# Patient Record
Sex: FEMALE
[De-identification: names, ages, dates, MRNs, and addresses within clinical notes are randomized per-mention and may not be internally consistent; named-entity substitution may affect disease eponyms.]

---

## 2019-02-11 ENCOUNTER — HOSPITAL ENCOUNTER (OUTPATIENT)
Dept: HOSPITAL 74 - FRADUS-SUR | Age: 49
Discharge: HOME | End: 2019-02-11
Attending: SURGERY
Payer: COMMERCIAL

## 2019-02-11 DIAGNOSIS — N60.31: Primary | ICD-10-CM

## 2019-02-11 DIAGNOSIS — N63.10: ICD-10-CM

## 2019-02-11 DIAGNOSIS — N60.81: ICD-10-CM

## 2019-02-11 PROCEDURE — 0HBT3ZX EXCISION OF RIGHT BREAST, PERCUTANEOUS APPROACH, DIAGNOSTIC: ICD-10-PCS | Performed by: SURGERY

## 2019-02-11 PROCEDURE — A4648 IMPLANTABLE TISSUE MARKER: HCPCS

## 2019-02-12 NOTE — OP
DATE OF OPERATION:  02/11/2019

 

PREOPERATIVE DIAGNOSIS:  Right breast mass at 12 o'clock, 4 cm from the nipple.

 

POSTOPERATIVE DIAGNOSIS:  Right breast mass at 12 o'clock, 4 cm from the nipple.

 

PROCEDURE:  Right ultrasound-guided core biopsy with clip placement.

 

ANESTHESIA:  Local.

 

ATTENDING SURGEON:  Francesco Villafuerte MD

 

ESTIMATED BLOOD LOSS:  Minimal.

 

COMPLICATIONS:  None.

 

PROCEDURE:  Patient was made aware of the risks and benefits of the procedure and

consented.  She was placed in the supine position.  Under sterile conditions with 1%

lidocaine for local anesthesia a small nick was made in the skin.  Using a 13-gauge

suction biopsy device via lateral approach under ultrasound guidance multiple cores

were obtained and submitted to Pathology.  Likewise under ultrasound guidance a

U-shaped clip was placed into the biopsy region.  Steri-Strips and a sterile bandage

were then applied, the patient having tolerated the procedure well.  Will contact her

with the results.

 

 

FRANCESCO VILLAFUERTE M.D.

 

[id number]

DD: 02/11/2019 16:56

DT: 02/12/2019 09:55

Job #:  [id number]

## 2019-02-13 NOTE — PATH
Surgical Pathology Report



Patient Name:  LEONID KIRAN

Accession #:  

[facility]. Rec. #:  [id number]                                                        

   /Age/Gender:  1970 (Age: 48) / F

Account:  [id number]                                                          

             Location: [facility]

Taken:  2019

Received:  2019

Reported:  2019

Physicians:  Francesco Villafuerte M.D.

  



Specimen(s) Received

 RIGHT BREAST 12:00 4 CM FN CORE BIOPSY 





Clinical History

Right breast biopsy 







Final Diagnosis

BREAST, RIGHT, 12:00, 4 CM FN, CORE BIOPSY:

BENIGN BREAST TISSUE SHOWING STROMAL FIBROSIS, MILD USUAL DUCTAL HYPERPLASIA

(UDH) AND COLUMNAR CELL CHANGE.





***Electronically Signed***

Brittany Bess M.D.





Gross Description

Received in formalin labeled "right breast biopsy 12:00, 4 cmfn," is a 1.7 x 1.1

x 0.2 cm aggregate of multiple tan-yellow, irregular to cylindrical portions of

fibroadipose tissue. The formalin is filtered and the specimen is entirely

submitted in one cassette.

Time to formalin fixation: Not given

Total formalin fixation time: Approximately 24 hours

## 2022-09-15 ENCOUNTER — HOSPITAL ENCOUNTER (OUTPATIENT)
Dept: HOSPITAL 74 - JASU-ENDO | Age: 52
Discharge: HOME | End: 2022-09-15
Attending: INTERNAL MEDICINE
Payer: COMMERCIAL

## 2022-09-15 VITALS
RESPIRATION RATE: 14 BRPM | SYSTOLIC BLOOD PRESSURE: 118 MMHG | TEMPERATURE: 98 F | DIASTOLIC BLOOD PRESSURE: 66 MMHG | HEART RATE: 56 BPM

## 2022-09-15 VITALS — BODY MASS INDEX: 25.9 KG/M2

## 2022-09-15 DIAGNOSIS — Z12.11: Primary | ICD-10-CM

## 2022-09-15 PROCEDURE — 0DJD8ZZ INSPECTION OF LOWER INTESTINAL TRACT, VIA NATURAL OR ARTIFICIAL OPENING ENDOSCOPIC: ICD-10-PCS | Performed by: INTERNAL MEDICINE
